# Patient Record
Sex: FEMALE | Race: WHITE | Employment: FULL TIME | URBAN - METROPOLITAN AREA
[De-identification: names, ages, dates, MRNs, and addresses within clinical notes are randomized per-mention and may not be internally consistent; named-entity substitution may affect disease eponyms.]

---

## 2021-06-15 ENCOUNTER — HOSPITAL ENCOUNTER (INPATIENT)
Facility: HOSPITAL | Age: 31
LOS: 2 days | Discharge: HOME | DRG: 203 | End: 2021-06-18
Attending: EMERGENCY MEDICINE | Admitting: HOSPITALIST

## 2021-06-15 ENCOUNTER — APPOINTMENT (OUTPATIENT)
Dept: GENERAL RADIOLOGY | Facility: HOSPITAL | Age: 31
DRG: 203 | End: 2021-06-15
Attending: EMERGENCY MEDICINE

## 2021-06-15 DIAGNOSIS — J45.51 SEVERE PERSISTENT ASTHMA WITH EXACERBATION: Primary | ICD-10-CM

## 2021-06-15 PROCEDURE — 71045 X-RAY EXAM CHEST 1 VIEW: CPT | Performed by: EMERGENCY MEDICINE

## 2021-06-15 RX ORDER — MAGNESIUM SULFATE HEPTAHYDRATE 40 MG/ML
2 INJECTION, SOLUTION INTRAVENOUS ONCE
Status: COMPLETED | OUTPATIENT
Start: 2021-06-15 | End: 2021-06-15

## 2021-06-15 RX ORDER — KETAMINE HYDROCHLORIDE 50 MG/ML
INJECTION, SOLUTION, CONCENTRATE INTRAMUSCULAR; INTRAVENOUS
Status: DISCONTINUED
Start: 2021-06-15 | End: 2021-06-15 | Stop reason: WASHOUT

## 2021-06-15 RX ORDER — ALBUTEROL SULFATE 2.5 MG/3ML
10 SOLUTION RESPIRATORY (INHALATION) ONCE
Status: DISCONTINUED | OUTPATIENT
Start: 2021-06-15 | End: 2021-06-15

## 2021-06-15 RX ORDER — METHYLPREDNISOLONE SODIUM SUCCINATE 125 MG/2ML
INJECTION, POWDER, LYOPHILIZED, FOR SOLUTION INTRAMUSCULAR; INTRAVENOUS
Status: COMPLETED
Start: 2021-06-15 | End: 2021-06-15

## 2021-06-15 RX ORDER — ALBUTEROL SULFATE 2.5 MG/3ML
10 SOLUTION RESPIRATORY (INHALATION) CONTINUOUS
Status: DISCONTINUED | OUTPATIENT
Start: 2021-06-15 | End: 2021-06-16

## 2021-06-15 RX ORDER — METHYLPREDNISOLONE SODIUM SUCCINATE 125 MG/2ML
125 INJECTION, POWDER, LYOPHILIZED, FOR SOLUTION INTRAMUSCULAR; INTRAVENOUS ONCE
Status: COMPLETED | OUTPATIENT
Start: 2021-06-15 | End: 2021-06-15

## 2021-06-16 PROBLEM — J45.51 SEVERE PERSISTENT ASTHMA WITH EXACERBATION: Status: ACTIVE | Noted: 2021-06-16

## 2021-06-16 PROCEDURE — 99291 CRITICAL CARE FIRST HOUR: CPT | Performed by: INTERNAL MEDICINE

## 2021-06-16 PROCEDURE — 99222 1ST HOSP IP/OBS MODERATE 55: CPT | Performed by: HOSPITALIST

## 2021-06-16 RX ORDER — TERBUTALINE SULFATE 1 MG/ML
0.25 INJECTION, SOLUTION SUBCUTANEOUS
Status: DISPENSED | OUTPATIENT
Start: 2021-06-16 | End: 2021-06-16

## 2021-06-16 RX ORDER — BENZONATATE 100 MG/1
200 CAPSULE ORAL 3 TIMES DAILY PRN
Status: DISCONTINUED | OUTPATIENT
Start: 2021-06-16 | End: 2021-06-18

## 2021-06-16 RX ORDER — SODIUM CHLORIDE 9 MG/ML
INJECTION, SOLUTION INTRAVENOUS CONTINUOUS
Status: ACTIVE | OUTPATIENT
Start: 2021-06-16 | End: 2021-06-16

## 2021-06-16 RX ORDER — IPRATROPIUM BROMIDE AND ALBUTEROL SULFATE 2.5; .5 MG/3ML; MG/3ML
3 SOLUTION RESPIRATORY (INHALATION)
Status: DISCONTINUED | OUTPATIENT
Start: 2021-06-16 | End: 2021-06-16

## 2021-06-16 RX ORDER — SODIUM CHLORIDE 9 MG/ML
INJECTION, SOLUTION INTRAVENOUS CONTINUOUS
Status: DISCONTINUED | OUTPATIENT
Start: 2021-06-16 | End: 2021-06-16

## 2021-06-16 RX ORDER — FLUTICASONE FUROATE, UMECLIDINIUM BROMIDE AND VILANTEROL TRIFENATATE 100; 62.5; 25 UG/1; UG/1; UG/1
POWDER RESPIRATORY (INHALATION)
COMMUNITY
Start: 2019-03-23

## 2021-06-16 RX ORDER — MIDAZOLAM HYDROCHLORIDE 1 MG/ML
1 INJECTION INTRAMUSCULAR; INTRAVENOUS ONCE
Status: COMPLETED | OUTPATIENT
Start: 2021-06-16 | End: 2021-06-16

## 2021-06-16 RX ORDER — ALBUTEROL SULFATE 90 UG/1
AEROSOL, METERED RESPIRATORY (INHALATION)
COMMUNITY
Start: 2019-02-22

## 2021-06-16 RX ORDER — HEPARIN SODIUM 5000 [USP'U]/ML
5000 INJECTION, SOLUTION INTRAVENOUS; SUBCUTANEOUS EVERY 12 HOURS SCHEDULED
Status: DISCONTINUED | OUTPATIENT
Start: 2021-06-16 | End: 2021-06-18

## 2021-06-16 RX ORDER — IPRATROPIUM BROMIDE AND ALBUTEROL SULFATE 2.5; .5 MG/3ML; MG/3ML
3 SOLUTION RESPIRATORY (INHALATION)
Status: DISCONTINUED | OUTPATIENT
Start: 2021-06-17 | End: 2021-06-18

## 2021-06-16 RX ORDER — HYDRALAZINE HYDROCHLORIDE 20 MG/ML
10 INJECTION INTRAMUSCULAR; INTRAVENOUS EVERY 4 HOURS PRN
Status: DISCONTINUED | OUTPATIENT
Start: 2021-06-16 | End: 2021-06-17

## 2021-06-16 RX ORDER — IPRATROPIUM BROMIDE AND ALBUTEROL SULFATE 2.5; .5 MG/3ML; MG/3ML
SOLUTION RESPIRATORY (INHALATION)
Status: COMPLETED
Start: 2021-06-16 | End: 2021-06-16

## 2021-06-16 RX ORDER — ALBUTEROL SULFATE 2.5 MG/3ML
10 SOLUTION RESPIRATORY (INHALATION) CONTINUOUS
Status: DISCONTINUED | OUTPATIENT
Start: 2021-06-16 | End: 2021-06-17

## 2021-06-16 RX ORDER — ONDANSETRON 2 MG/ML
4 INJECTION INTRAMUSCULAR; INTRAVENOUS EVERY 4 HOURS PRN
Status: ACTIVE | OUTPATIENT
Start: 2021-06-16 | End: 2021-06-16

## 2021-06-16 RX ORDER — NICOTINE 21 MG/24HR
1 PATCH, TRANSDERMAL 24 HOURS TRANSDERMAL DAILY
Status: DISCONTINUED | OUTPATIENT
Start: 2021-06-16 | End: 2021-06-18

## 2021-06-16 RX ORDER — MIDAZOLAM HYDROCHLORIDE 1 MG/ML
INJECTION INTRAMUSCULAR; INTRAVENOUS
Status: COMPLETED
Start: 2021-06-16 | End: 2021-06-16

## 2021-06-16 RX ORDER — METHYLPREDNISOLONE SODIUM SUCCINATE 125 MG/2ML
80 INJECTION, POWDER, LYOPHILIZED, FOR SOLUTION INTRAMUSCULAR; INTRAVENOUS EVERY 8 HOURS
Status: DISCONTINUED | OUTPATIENT
Start: 2021-06-16 | End: 2021-06-17

## 2021-06-16 RX ORDER — IPRATROPIUM BROMIDE AND ALBUTEROL SULFATE 2.5; .5 MG/3ML; MG/3ML
10 SOLUTION RESPIRATORY (INHALATION) CONTINUOUS
Status: DISCONTINUED | OUTPATIENT
Start: 2021-06-16 | End: 2021-06-16

## 2021-06-16 NOTE — CM/SW NOTE
06/16/21 1000   CM/SW Referral Data   Referral Source Physician   Reason for Referral Discharge planning   Informant Friend   Patient Status Prior to Admission   Independent with ADLs and Mobility Yes   Discharge Needs   Anticipated D/C needs To be dete

## 2021-06-16 NOTE — H&P
[facility]    PATIENT'S NAME: Mario Ryan   ATTENDING PHYSICIAN: Joycelyn Adames DO   PATIENT ACCOUNT#:   [de-identified]    LOCATION:  [address] [place] [address] Road #:   [id number]       YOB: 1990  ADMISSION DATE:       06/15/2021 to give the patient a stat nebulizer treatment. PAST MEDICAL HISTORY:  Significant for asthma. She denies any other chronic medical problems including diabetes, high blood pressure, and heart problems. She denies any previous hospitalizations.   No pre sounds. No guarding. No rebound. EXTREMITIES:  No clubbing, cyanosis, calf tenderness, palpable cords, or edema. SKIN:  Warm and dry with multiple tattoos. BACK:  There was no costovertebral angle tenderness.   NEUROLOGIC:  Her mood and affect were ple

## 2021-06-16 NOTE — ED QUICK NOTES
[facility] Patient receiving continuous neb at this time.

## 2021-06-16 NOTE — CONSULTS
Pulmonary/Critical Care Consultation Note    HPI:   Regino Eduardo is a 31year old female with chief complaint of Patient presents with:  Difficulty Breathing    PHYSICIAN NONSTAFF    I was asked by the attending physician to see this patient in pulmonary/cr (BRETHINE) 1 MG/ML injection 0.25 mg, 0.25 mg, Subcutaneous, Q20 min  albuterol sulfate (VENTOLIN) (2.5 MG/3ML) 0.083% nebulizer solution 10 mg, 10 mg, Nebulization, Continuous  [COMPLETED] Ipratropium Bromide (ATROVENT) 0.02 % nebulizer solution 0.5 mg, 0 (67.6 kg)   SpO2 98%   BMI 24.79 kg/m²     Intake/Output Summary (Last 24 hours) at 6/16/2021 0936  Last data filed at 6/16/2021 0600  Gross per 24 hour   Intake 228 ml   Output 350 ml   Net -122 ml     Mild resp distress  A&Ox3  Head AT/NC  Eyes Anicteric px  SCDs    EOL  Full code    35 min CCT                Rosa Zhang MD  6/16/2021  9:32 AM

## 2021-06-16 NOTE — ED QUICK NOTES
Orders for admission, patient is aware of plan and ready to go upstairs. Any questions, please call ED RAIMUNDO García at extension [phone].       Type of COVID test sent:Rapid  COVID Suspicion level: Not Detected    Titratable drug(s) infusing:N/A  Rate:    LOC at

## 2021-06-16 NOTE — PROGRESS NOTES
Called to patricia for persistent resp distress. Patient sitting up able to speak but noticeable increased work of breathing. Very tight with decreased air movement. Oxygenating ok on 5l,  She is also anxious as well.       BP (!) 161/90 (BP Location: Right

## 2021-06-16 NOTE — RESPIRATORY THERAPY NOTE
07:15am- Patient received on continue albuterol neb with non-rebreather mask connected with heliox. Pt. was labored breathing and very tight on both side of lungs. 08:05am- started continue neb again with albuterol 10 mg and atrovent.  Pt. feel much bet

## 2021-06-16 NOTE — ED PROVIDER NOTES
Patient Seen in: [facility] Emergency Department    History   Patient presents with:  Difficulty Breathing      HPI    The patient presents to the ED via EMS for severe asthma exacerbation symptoms.  She states history of asthma and symptoms began on my arrival to the room.  Personal protective equipment including droplet mask, eye protection, and gloves were worn throughout the duration of the exam.  Handwashing was performed prior to and after the exam.  Stethoscope and any equipment used during my Abnormality         Status                                     ---------                               -----------         ------                                     CBC W/ DIFFERENTIAL[[id number]]          Abnormal            Preliminary result methylPREDNISolone Sodium Succ (Solu-MEDROL) injection 125 mg (125 mg Intravenous Given 6/15/21 2059)   Magnesium Sulfate IVPB premix SOLN 2 g (0 g Intravenous Stopped 6/15/21 2208)   Ipratropium Bromide (ATROVENT) 0.02 % nebulizer solution 0.5 mg (0.5 m total of 86 minutes of critical care time in obtaining history, performing a physical exam, bedside monitoring of interventions, collecting and interpreting tests and discussion with consultants but not including time spent performing procedures.     Nicolle Caldera

## 2021-06-16 NOTE — ED QUICK NOTES
Patients breathing is less labored. However, patient still has significant wheezing heard bilaterally. MD aware, patient to receive another hour long. Patient remains on monitors.

## 2021-06-16 NOTE — ED INITIAL ASSESSMENT (HPI)
Patient presents to ER via EMS for complaints of asthma exacerbathion. Patient arrives clearly struggling to breathe. INS/EXP wheezing heard bilaterally.    Patient notes this began around 9am, but got worse around 5pm.

## 2021-06-16 NOTE — PROGRESS NOTES
See H and P from Dr Phil Gama from earlier today. Pt seen and examined. Slight improvement. Cont nebs, steroids   Add iv hydralazine prn for high bp. Apprec Dr Eduar Wing input.      Yomi Posey DO

## 2021-06-16 NOTE — PROGRESS NOTES
ADMISSION NOTE    31year old female with asthma for 7 years never hosptialized or intubated in the past.   Presents with severe wheezing and sob. Received epi solumedrol, and 3 hour long nebs in ED. Still SOB and tachypneic .  Available medical records pa

## 2021-06-16 NOTE — PLAN OF CARE
Received pt from the ED on 4L NC, adequate SPO2 saturations noted despite pt stating she feels like she cannot catch her breath. Pt very anxious, has increased WOB, use of accessory muscles, diaphoretic, dizzy, lightheaded.  HR tachycardic and BP hypertensi medications  -oxygen therapy  -neb treatments   - See additional Care Plan goals for specific interventions  Outcome: Progressing  Goal: Patient/Family Short Term Goal  Description: Patient's Short Term Goal: to improve airway status    Interventions:   - pain and request assistance  - Assess pain using appropriate pain scale  - Administer analgesics based on type and severity of pain and evaluate response  - Implement non-pharmacological measures as appropriate and evaluate response  - Consider cultural an preferences of patient/family/discharge partner  - Complete POLST form as appropriate  - Assess patient's ability to be responsible for managing their own health  - Refer to Case Management Department for coordinating discharge planning if the patient need

## 2021-06-17 ENCOUNTER — APPOINTMENT (OUTPATIENT)
Dept: GENERAL RADIOLOGY | Facility: HOSPITAL | Age: 31
DRG: 203 | End: 2021-06-17
Attending: INTERNAL MEDICINE

## 2021-06-17 PROCEDURE — 71045 X-RAY EXAM CHEST 1 VIEW: CPT | Performed by: INTERNAL MEDICINE

## 2021-06-17 PROCEDURE — 99233 SBSQ HOSP IP/OBS HIGH 50: CPT | Performed by: INTERNAL MEDICINE

## 2021-06-17 PROCEDURE — 99233 SBSQ HOSP IP/OBS HIGH 50: CPT | Performed by: HOSPITALIST

## 2021-06-17 RX ORDER — ACETAMINOPHEN 325 MG/1
650 TABLET ORAL EVERY 6 HOURS PRN
Status: DISCONTINUED | OUTPATIENT
Start: 2021-06-17 | End: 2021-06-18

## 2021-06-17 RX ORDER — HYDRALAZINE HYDROCHLORIDE 20 MG/ML
20 INJECTION INTRAMUSCULAR; INTRAVENOUS EVERY 4 HOURS PRN
Status: DISCONTINUED | OUTPATIENT
Start: 2021-06-17 | End: 2021-06-18

## 2021-06-17 RX ORDER — BUTALBITAL, ACETAMINOPHEN AND CAFFEINE 50; 325; 40 MG/1; MG/1; MG/1
1 TABLET ORAL EVERY 4 HOURS PRN
Status: DISCONTINUED | OUTPATIENT
Start: 2021-06-17 | End: 2021-06-18

## 2021-06-17 RX ORDER — METHYLPREDNISOLONE SODIUM SUCCINATE 40 MG/ML
40 INJECTION, POWDER, LYOPHILIZED, FOR SOLUTION INTRAMUSCULAR; INTRAVENOUS EVERY 8 HOURS
Status: DISCONTINUED | OUTPATIENT
Start: 2021-06-17 | End: 2021-06-18

## 2021-06-17 RX ORDER — LORAZEPAM 1 MG/1
1 TABLET ORAL 3 TIMES DAILY PRN
Status: DISCONTINUED | OUTPATIENT
Start: 2021-06-17 | End: 2021-06-18

## 2021-06-17 NOTE — PLAN OF CARE
Patient remains alert, oriented, & able to participate in plan of care throughout shift. Anxiety greatly decreased as asthma symptoms resolve after steroids/nebulizer treatments.      Problem: Patient Centered Care  Goal: Patient preferences are identifie indicated  - Evaluate effectiveness of antiarrhythmic and heart rate control medications as ordered  - Initiate emergency measures for life threatening arrhythmias  - Monitor electrolytes and administer replacement therapy as ordered  Outcome: Progressing interventions unsuccessful or patient reports new pain  - Anticipate increased pain with activity and pre-medicate as appropriate  Outcome: Progressing     Problem: RISK FOR INFECTION - ADULT  Goal: Absence of fever/infection during anticipated neutropenic

## 2021-06-17 NOTE — PROGRESS NOTES
[facility]    Progress Note    Donna [place] Patient Status:  Inpatient    1990 MRN [id number]   Location [facility] 2W/SW Attending Starr Bess, [address] Day # 1 PCP PHYSICIAN NONSTAFF       Subjective:    Donna Quispe is a Value Date    WBC 24.5 (H) 06/17/2021    HGB 15.8 06/17/2021    HCT 48.1 (H) 06/17/2021    .0 06/17/2021    CREATSERUM 0.51 (L) 06/17/2021    BUN 12 06/17/2021     06/17/2021    K 4.2 06/17/2021     06/17/2021    CO2 26.0 06/17/2021    G CA 9.4 9.7 9.4    135* 138   K 3.9 4.0 4.2    102 105   CO2 29.0 24.0 26.0             Assessment and Plan:     Severe persistent asthma with exacerbation  - apprec pulm consult   - cont duonebs   - taper steroids   - abx added due to bandemi

## 2021-06-17 NOTE — PROGRESS NOTES
Pulmonary/Critical Care Follow Up Note    HPI:   Osmin Holt is a 31year old female with Patient presents with:  Difficulty Breathing      PCP PHYSICIAN NONSTAFF  [facility]    anxioius  Wants to go home ml   Net -185 ml     nad  Speaks complete sentances  Lung b/l wheeze inc effort  cv reg tachy  abd soft +bs no mass  Ext no edema      LABS:    Lab Results   Component Value Date    WBC 24.5 06/17/2021    HGB 15.8 06/17/2021    HCT 48.1 06/17/2021    PLT 3

## 2021-06-17 NOTE — CM/SW NOTE
Received MDO for finance. Met with patient alone at bedside. Patient lives w/ several roommate & independent at baseline. She works full time as a  at [employer]. She confirms she is uninsured & plans on enrolling in August w/ Darryl Hua.  Lesly Rodriguez

## 2021-06-18 VITALS
BODY MASS INDEX: 23.22 KG/M2 | HEIGHT: 65 IN | RESPIRATION RATE: 20 BRPM | SYSTOLIC BLOOD PRESSURE: 157 MMHG | TEMPERATURE: 98 F | DIASTOLIC BLOOD PRESSURE: 89 MMHG | OXYGEN SATURATION: 96 % | WEIGHT: 139.38 LBS | HEART RATE: 122 BPM

## 2021-06-18 PROCEDURE — 99232 SBSQ HOSP IP/OBS MODERATE 35: CPT | Performed by: INTERNAL MEDICINE

## 2021-06-18 PROCEDURE — 99239 HOSP IP/OBS DSCHRG MGMT >30: CPT | Performed by: HOSPITALIST

## 2021-06-18 RX ORDER — PREDNISONE 10 MG/1
TABLET ORAL
Qty: 40 TABLET | Refills: 0 | Status: SHIPPED | OUTPATIENT
Start: 2021-06-18 | End: 2021-07-04

## 2021-06-18 RX ORDER — PREDNISONE 10 MG/1
TABLET ORAL
Qty: 40 TABLET | Refills: 0 | Status: SHIPPED | OUTPATIENT
Start: 2021-06-18 | End: 2021-06-18

## 2021-06-18 RX ORDER — NICOTINE 21 MG/24HR
1 PATCH, TRANSDERMAL 24 HOURS TRANSDERMAL DAILY
Qty: 39 PATCH | Refills: 0 | Status: SHIPPED | OUTPATIENT
Start: 2021-06-19 | End: 2021-06-18

## 2021-06-18 RX ORDER — METHYLPREDNISOLONE SODIUM SUCCINATE 40 MG/ML
40 INJECTION, POWDER, LYOPHILIZED, FOR SOLUTION INTRAMUSCULAR; INTRAVENOUS EVERY 12 HOURS
Status: DISCONTINUED | OUTPATIENT
Start: 2021-06-18 | End: 2021-06-18

## 2021-06-18 RX ORDER — AMLODIPINE BESYLATE 10 MG/1
5 TABLET ORAL DAILY
Qty: 30 TABLET | Refills: 0 | Status: SHIPPED | OUTPATIENT
Start: 2021-06-18

## 2021-06-18 RX ORDER — AZITHROMYCIN 250 MG/1
500 TABLET, FILM COATED ORAL ONCE
Status: COMPLETED | OUTPATIENT
Start: 2021-06-18 | End: 2021-06-18

## 2021-06-18 RX ORDER — NICOTINE 21 MG/24HR
1 PATCH, TRANSDERMAL 24 HOURS TRANSDERMAL DAILY
Qty: 39 PATCH | Refills: 0 | Status: SHIPPED | OUTPATIENT
Start: 2021-06-19

## 2021-06-18 RX ORDER — PREDNISONE 10 MG/1
TABLET ORAL
Qty: 30 TABLET | Refills: 0 | Status: SHIPPED | OUTPATIENT
Start: 2021-06-18 | End: 2021-06-18

## 2021-06-18 NOTE — PLAN OF CARE
Pt. slept throughout most of the shift. On room air. SpO2 97%. No complaints of SOB. Nebs q 4. IV solumedrol. No complaints at this time. Will continue to monitor. Plan for discharge home pending medical clearance.      Problem: Patient Centered Care  Goal: signs, obtain 12 lead EKG if indicated  - Evaluate effectiveness of antiarrhythmic and heart rate control medications as ordered  - Initiate emergency measures for life threatening arrhythmias  - Monitor electrolytes and administer replacement therapy as o

## 2021-06-18 NOTE — PLAN OF CARE
Problem: Patient Centered Care  Goal: Patient preferences are identified and integrated in the patient's plan of care  Description: Interventions:  - What would you like us to know as we care for you?  Home with roomate  - Provide timely, complete, and ac rate control medications as ordered  - Initiate emergency measures for life threatening arrhythmias  - Monitor electrolytes and administer replacement therapy as ordered  Outcome: Progressing     Problem: RESPIRATORY - ADULT  Goal: Achieves optimal ventila interventions unsuccessful or patient reports new pain  - Anticipate increased pain with activity and pre-medicate as appropriate  Outcome: Progressing     Problem: RISK FOR INFECTION - ADULT  Goal: Absence of fever/infection during anticipated neutropenic

## 2021-06-18 NOTE — DISCHARGE SUMMARY
[facility]    Discharge Summary    Rebecca Brandie Patient Status:  Inpatient    1990 MRN [id number]   Location [facility] 3W/SW Attending No att. providers found   Hosp Day # 2 PCP PHYSICIAN NONSTAFF     Date of Admission: 6/15 contacts, no fevers or chills. She said she was in her usual state of health until the morning of admission, when she developed some wheezing and shortness of breath.   She tried to use her nebulizer for the first time, but thought it actually made her fee Take 0.5 tablets (5 mg total) by mouth daily. Quantity: 30 tablet  Refills: 0     nicotine 14 MG/24HR Pt24  Commonly known as: Nishi Hyman  Start taking on: June 19, 2021      Place 1 patch onto the skin daily.    Quantity: 39 patch  Refills: 0     predni

## 2021-06-18 NOTE — DISCHARGE PLANNING
I called and spoke with BHUMI Tran ph#[phone] at [address] [place] [address] in [facility] (after obtaining Jodi's consent) to confirm if she can receive samples of Trelegy.  Fariba Tran confirmed she sees patient as outpatient and that patient can  samples of

## 2021-06-18 NOTE — PROGRESS NOTES
Pulmonary/Critical Care Follow Up Note    HPI:   Dayanara Neff is a 31year old female with Patient presents with:  Difficulty Breathing      PCP PHYSICIAN NONSTAFF  Admission Attending Terry Barton 15 Day #2    Less anxious  Interested i complete sentances  Lung b/l wheeze and normal effort and improved  CV reg tachy  Abd soft +bs no mass  Ext no edema      LABS:    Lab Results   Component Value Date    WBC 19.1 06/18/2021    HGB 15.6 06/18/2021    HCT 47.3 06/18/2021    .0 06/18/20
